# Patient Record
Sex: FEMALE
[De-identification: names, ages, dates, MRNs, and addresses within clinical notes are randomized per-mention and may not be internally consistent; named-entity substitution may affect disease eponyms.]

---

## 2019-11-14 ENCOUNTER — HOSPITAL ENCOUNTER (OUTPATIENT)
Dept: HOSPITAL 88 - OR | Age: 59
Discharge: HOME | End: 2019-11-14
Attending: INTERNAL MEDICINE
Payer: COMMERCIAL

## 2019-11-14 VITALS — SYSTOLIC BLOOD PRESSURE: 116 MMHG | DIASTOLIC BLOOD PRESSURE: 68 MMHG

## 2019-11-14 DIAGNOSIS — I10: ICD-10-CM

## 2019-11-14 DIAGNOSIS — K21.0: ICD-10-CM

## 2019-11-14 DIAGNOSIS — K64.8: ICD-10-CM

## 2019-11-14 DIAGNOSIS — Z79.82: ICD-10-CM

## 2019-11-14 DIAGNOSIS — K29.70: Primary | ICD-10-CM

## 2019-11-14 DIAGNOSIS — Z71.3: ICD-10-CM

## 2019-11-14 DIAGNOSIS — Z01.810: ICD-10-CM

## 2019-11-14 DIAGNOSIS — E66.3: ICD-10-CM

## 2019-11-14 DIAGNOSIS — K44.9: ICD-10-CM

## 2019-11-14 PROCEDURE — 43239 EGD BIOPSY SINGLE/MULTIPLE: CPT

## 2019-11-14 PROCEDURE — 93005 ELECTROCARDIOGRAM TRACING: CPT

## 2019-11-14 PROCEDURE — 45378 DIAGNOSTIC COLONOSCOPY: CPT

## 2019-11-14 NOTE — XMS REPORT
Patient Summary Document

                             Created on: 2019



JOSE ARMANDO ROYAL

External Reference #: [id number]

: 1960

Sex: Female



Demographics







                          Address                   [address]

[place]  [address]

 

                          Home Phone                [phone]

 

                          Preferred Language        Unknown

 

                          Marital Status            Unknown

 

                          Sikhism Affiliation     Unknown

 

                          Race                      Unknown

 

                          Ethnic Group              Unknown





Author







                          Author                    [facility]

 

                          Address                   Unknown

 

                          Phone                     Unavailable







Support







                Name            Relationship    Address         Phone

 

                    DYLON ROYAL      PRS                 [id number]

[place]  [address] [phone]

 

                    TAMELA ROYAL    PRS                 [address]

[place], [place]  [id number] [phone]

 

                    FELIPE LEIVA    PRS                 [address]

[place], [place]  [address] [phone]







Care Team Providers







                    Care Team Member Name    Role                Phone

 

                          Unavailable               Unavailable







Payers







             Payer Name    Policy Type    Policy Number    Effective Date    Expiration Date







Problems

This patient has no known problems.



Allergies, Adverse Reactions, Alerts







          Allergy Name    Allergy Type    Status    Severity    Reaction(s)    Onset Date    Inactive 

Date                      Treating Clinician        Comments

 

        aspirin    DA      Active    SV              2018 00:00:00                     

 

        aspirin    DA      Active    SV              2013-01-10 00:00:00                     







Medications

This patient has no known medications.